# Patient Record
Sex: FEMALE | Race: WHITE | NOT HISPANIC OR LATINO | Employment: FULL TIME | URBAN - METROPOLITAN AREA
[De-identification: names, ages, dates, MRNs, and addresses within clinical notes are randomized per-mention and may not be internally consistent; named-entity substitution may affect disease eponyms.]

---

## 2023-05-03 PROBLEM — E66.9 OBESITY: Status: ACTIVE | Noted: 2023-05-03

## 2023-05-03 PROBLEM — E03.9 HYPOTHYROIDISM: Status: ACTIVE | Noted: 2023-05-03

## 2023-05-03 PROBLEM — R73.9 ELEVATED BLOOD SUGAR: Status: ACTIVE | Noted: 2023-05-03

## 2023-05-03 PROBLEM — R79.89 ELEVATED LIVER FUNCTION TESTS: Status: ACTIVE | Noted: 2023-05-03

## 2023-05-03 PROBLEM — G35 MULTIPLE SCLEROSIS (MULTI): Status: ACTIVE | Noted: 2023-05-03

## 2023-05-03 PROBLEM — E78.5 HYPERLIPEMIA: Status: ACTIVE | Noted: 2023-05-03

## 2023-05-03 PROBLEM — I10 BENIGN HYPERTENSION: Status: ACTIVE | Noted: 2023-05-03

## 2023-05-03 PROBLEM — R51.9 HEADACHE: Status: ACTIVE | Noted: 2023-05-03

## 2023-05-03 PROBLEM — D51.0 PERNICIOUS ANEMIA: Status: ACTIVE | Noted: 2023-05-03

## 2023-05-03 PROBLEM — L65.9 HAIR THINNING: Status: ACTIVE | Noted: 2023-05-03

## 2023-05-03 PROBLEM — K76.0 FATTY LIVER: Status: ACTIVE | Noted: 2023-05-03

## 2023-05-03 PROBLEM — D22.9 MULTIPLE ATYPICAL SKIN MOLES: Status: ACTIVE | Noted: 2023-05-03

## 2023-05-03 PROBLEM — B35.1 TOENAIL FUNGUS: Status: ACTIVE | Noted: 2023-05-03

## 2023-05-03 PROBLEM — M21.371 RIGHT FOOT DROP: Status: ACTIVE | Noted: 2023-05-03

## 2023-05-03 RX ORDER — AMLODIPINE BESYLATE 2.5 MG/1
1 TABLET ORAL DAILY
COMMUNITY
Start: 2020-04-09 | End: 2023-05-04 | Stop reason: SDUPTHER

## 2023-05-03 RX ORDER — CLADRIBINE 10 MG/1
TABLET ORAL
COMMUNITY
Start: 2022-04-06 | End: 2023-11-09 | Stop reason: ALTCHOICE

## 2023-05-03 RX ORDER — HYDROCHLOROTHIAZIDE 25 MG/1
1 TABLET ORAL DAILY
COMMUNITY
Start: 2020-05-04 | End: 2023-05-04 | Stop reason: SDUPTHER

## 2023-05-03 RX ORDER — CYANOCOBALAMIN 1000 UG/ML
INJECTION, SOLUTION INTRAMUSCULAR; SUBCUTANEOUS
COMMUNITY
Start: 2016-12-19

## 2023-05-03 RX ORDER — MULTIVITAMIN
1 TABLET ORAL DAILY
COMMUNITY
Start: 2021-05-04 | End: 2023-11-09 | Stop reason: ALTCHOICE

## 2023-05-03 RX ORDER — CICLOPIROX 80 MG/ML
SOLUTION TOPICAL
COMMUNITY
Start: 2022-05-19 | End: 2023-11-09 | Stop reason: ALTCHOICE

## 2023-05-03 RX ORDER — LOSARTAN POTASSIUM 100 MG/1
1 TABLET ORAL DAILY
COMMUNITY
Start: 2019-08-28 | End: 2023-05-04 | Stop reason: SDUPTHER

## 2023-05-03 RX ORDER — LEVOTHYROXINE SODIUM 75 UG/1
1 TABLET ORAL DAILY
COMMUNITY
Start: 2016-12-21 | End: 2023-11-12 | Stop reason: SDUPTHER

## 2023-05-03 RX ORDER — ROSUVASTATIN CALCIUM 10 MG/1
1 TABLET, COATED ORAL NIGHTLY
COMMUNITY
Start: 2020-05-26 | End: 2023-11-12 | Stop reason: SDUPTHER

## 2023-05-03 RX ORDER — CHOLECALCIFEROL (VITAMIN D3) 50 MCG
1 TABLET ORAL DAILY
COMMUNITY
Start: 2018-08-07

## 2023-05-04 ENCOUNTER — OFFICE VISIT (OUTPATIENT)
Dept: PRIMARY CARE | Facility: CLINIC | Age: 58
End: 2023-05-04
Payer: COMMERCIAL

## 2023-05-04 VITALS
TEMPERATURE: 97.1 F | SYSTOLIC BLOOD PRESSURE: 126 MMHG | WEIGHT: 256 LBS | DIASTOLIC BLOOD PRESSURE: 82 MMHG | BODY MASS INDEX: 43.94 KG/M2

## 2023-05-04 DIAGNOSIS — I10 BENIGN HYPERTENSION: Primary | ICD-10-CM

## 2023-05-04 DIAGNOSIS — E78.2 MIXED HYPERLIPIDEMIA: ICD-10-CM

## 2023-05-04 DIAGNOSIS — E03.9 ACQUIRED HYPOTHYROIDISM: ICD-10-CM

## 2023-05-04 DIAGNOSIS — R73.9 ELEVATED BLOOD SUGAR: ICD-10-CM

## 2023-05-04 DIAGNOSIS — G35 MULTIPLE SCLEROSIS (MULTI): ICD-10-CM

## 2023-05-04 DIAGNOSIS — E66.01 CLASS 3 SEVERE OBESITY DUE TO EXCESS CALORIES WITH SERIOUS COMORBIDITY AND BODY MASS INDEX (BMI) OF 40.0 TO 44.9 IN ADULT (MULTI): ICD-10-CM

## 2023-05-04 PROCEDURE — 1036F TOBACCO NON-USER: CPT | Performed by: NURSE PRACTITIONER

## 2023-05-04 PROCEDURE — 3079F DIAST BP 80-89 MM HG: CPT | Performed by: NURSE PRACTITIONER

## 2023-05-04 PROCEDURE — 3008F BODY MASS INDEX DOCD: CPT | Performed by: NURSE PRACTITIONER

## 2023-05-04 PROCEDURE — 99214 OFFICE O/P EST MOD 30 MIN: CPT | Performed by: NURSE PRACTITIONER

## 2023-05-04 PROCEDURE — 3074F SYST BP LT 130 MM HG: CPT | Performed by: NURSE PRACTITIONER

## 2023-05-04 RX ORDER — HYDROCHLOROTHIAZIDE 25 MG/1
25 TABLET ORAL DAILY
Qty: 90 TABLET | Refills: 1 | Status: SHIPPED | OUTPATIENT
Start: 2023-05-04 | End: 2023-11-20

## 2023-05-04 RX ORDER — LOSARTAN POTASSIUM 100 MG/1
100 TABLET ORAL DAILY
Qty: 90 TABLET | Refills: 1 | Status: SHIPPED | OUTPATIENT
Start: 2023-05-04 | End: 2023-11-12 | Stop reason: SDUPTHER

## 2023-05-04 RX ORDER — AMLODIPINE BESYLATE 2.5 MG/1
2.5 TABLET ORAL DAILY
Qty: 90 TABLET | Refills: 1 | Status: SHIPPED | OUTPATIENT
Start: 2023-05-04 | End: 2023-11-12 | Stop reason: SDUPTHER

## 2023-05-04 ASSESSMENT — ENCOUNTER SYMPTOMS
GASTROINTESTINAL NEGATIVE: 1
CARDIOVASCULAR NEGATIVE: 1
RESPIRATORY NEGATIVE: 1
CONSTITUTIONAL NEGATIVE: 1
PSYCHIATRIC NEGATIVE: 1

## 2023-05-04 NOTE — PROGRESS NOTES
Subjective   Patient ID: Anjana Rubin is a 58 y.o. female who presents for Follow-up (6 month follow).    HPI Presents today for follow up and medication renewal.  Tolerates medication well.  Has started taking all of her BP medications together in the morning and her BP at home has been 112-125/71-80  Is currently om Mavenclad for 2 years, is just finishing the last round.  Its purpose is to reset the immune system and should not need further treatments.   MS is stable sees neurology.  Mammogram as noted 3/23/2023.   Colon 2019, 5 year recheck  Has not had her shingrix vaccine yet but is waiting to finish this round of Mevanclad  Blood work 11/03/2022 reviewed as noted    Review of Systems   Constitutional: Negative.    Respiratory: Negative.     Cardiovascular: Negative.    Gastrointestinal: Negative.    Musculoskeletal:         As noted In HPI   Psychiatric/Behavioral: Negative.         Objective   /82 (BP Location: Left arm, Patient Position: Sitting)   Temp 36.2 °C (97.1 °F) (Temporal)   Wt 116 kg (256 lb)   BMI 43.94 kg/m²     Physical Exam  Constitutional:       Appearance: Normal appearance.   Cardiovascular:      Rate and Rhythm: Normal rate and regular rhythm.   Pulmonary:      Effort: Pulmonary effort is normal.      Breath sounds: Normal breath sounds.   Musculoskeletal:         General: Normal range of motion.   Neurological:      General: No focal deficit present.      Mental Status: She is alert.   Psychiatric:         Mood and Affect: Mood normal.         Behavior: Behavior normal.         Assessment/Plan   Problem List Items Addressed This Visit          Nervous    Multiple sclerosis (CMS/HCC)       Circulatory    Benign hypertension - Primary    Relevant Medications    losartan (Cozaar) 100 mg tablet    hydroCHLOROthiazide (HYDRODiuril) 25 mg tablet    amLODIPine (Norvasc) 2.5 mg tablet       Endocrine/Metabolic    Hypothyroidism    Obesity       Other    Elevated blood sugar    Hyperlipemia

## 2023-11-09 ENCOUNTER — OFFICE VISIT (OUTPATIENT)
Dept: PRIMARY CARE | Facility: CLINIC | Age: 58
End: 2023-11-09
Payer: COMMERCIAL

## 2023-11-09 ENCOUNTER — LAB (OUTPATIENT)
Dept: LAB | Facility: LAB | Age: 58
End: 2023-11-09
Payer: COMMERCIAL

## 2023-11-09 VITALS
BODY MASS INDEX: 44.73 KG/M2 | TEMPERATURE: 97.8 F | WEIGHT: 260.6 LBS | SYSTOLIC BLOOD PRESSURE: 134 MMHG | HEART RATE: 65 BPM | DIASTOLIC BLOOD PRESSURE: 82 MMHG

## 2023-11-09 DIAGNOSIS — E03.9 ACQUIRED HYPOTHYROIDISM: ICD-10-CM

## 2023-11-09 DIAGNOSIS — R73.9 ELEVATED BLOOD SUGAR: ICD-10-CM

## 2023-11-09 DIAGNOSIS — E78.2 MIXED HYPERLIPIDEMIA: ICD-10-CM

## 2023-11-09 DIAGNOSIS — I10 BENIGN HYPERTENSION: Primary | ICD-10-CM

## 2023-11-09 DIAGNOSIS — G35 MULTIPLE SCLEROSIS (MULTI): ICD-10-CM

## 2023-11-09 DIAGNOSIS — Z23 NEED FOR VACCINATION: ICD-10-CM

## 2023-11-09 DIAGNOSIS — I10 BENIGN HYPERTENSION: ICD-10-CM

## 2023-11-09 PROBLEM — L71.9 ROSACEA, UNSPECIFIED: Status: ACTIVE | Noted: 2021-11-03

## 2023-11-09 PROBLEM — L82.1 OTHER SEBORRHEIC KERATOSIS: Status: ACTIVE | Noted: 2021-11-03

## 2023-11-09 PROBLEM — M21.371 RIGHT FOOT DROP: Status: RESOLVED | Noted: 2023-05-03 | Resolved: 2023-11-09

## 2023-11-09 PROBLEM — R51.9 HEADACHE: Status: RESOLVED | Noted: 2023-05-03 | Resolved: 2023-11-09

## 2023-11-09 PROBLEM — B35.1 TOENAIL FUNGUS: Status: RESOLVED | Noted: 2023-05-03 | Resolved: 2023-11-09

## 2023-11-09 PROBLEM — D22.5 MELANOCYTIC NEVI OF TRUNK: Status: ACTIVE | Noted: 2021-11-03

## 2023-11-09 PROBLEM — D18.01 HEMANGIOMA OF SKIN AND SUBCUTANEOUS TISSUE: Status: ACTIVE | Noted: 2021-11-03

## 2023-11-09 PROBLEM — D17.21 BENIGN LIPOMATOUS NEOPLASM OF SKIN AND SUBCUTANEOUS TISSUE OF RIGHT ARM: Status: ACTIVE | Noted: 2021-11-03

## 2023-11-09 LAB
ALBUMIN SERPL BCP-MCNC: 4.6 G/DL (ref 3.4–5)
ALP SERPL-CCNC: 88 U/L (ref 33–110)
ALT SERPL W P-5'-P-CCNC: 93 U/L (ref 7–45)
ANION GAP SERPL CALC-SCNC: 16 MMOL/L (ref 10–20)
AST SERPL W P-5'-P-CCNC: 54 U/L (ref 9–39)
BASOPHILS # BLD AUTO: 0.04 X10*3/UL (ref 0–0.1)
BASOPHILS NFR BLD AUTO: 0.9 %
BILIRUB SERPL-MCNC: 0.8 MG/DL (ref 0–1.2)
BUN SERPL-MCNC: 14 MG/DL (ref 6–23)
CALCIUM SERPL-MCNC: 10.7 MG/DL (ref 8.6–10.3)
CHLORIDE SERPL-SCNC: 103 MMOL/L (ref 98–107)
CHOLEST SERPL-MCNC: 197 MG/DL (ref 0–199)
CHOLESTEROL/HDL RATIO: 4.5
CO2 SERPL-SCNC: 26 MMOL/L (ref 21–32)
CREAT SERPL-MCNC: 0.7 MG/DL (ref 0.5–1.05)
EOSINOPHIL # BLD AUTO: 0.14 X10*3/UL (ref 0–0.7)
EOSINOPHIL NFR BLD AUTO: 3.2 %
ERYTHROCYTE [DISTWIDTH] IN BLOOD BY AUTOMATED COUNT: 11.9 % (ref 11.5–14.5)
GFR SERPL CREATININE-BSD FRML MDRD: >90 ML/MIN/1.73M*2
GLUCOSE SERPL-MCNC: 102 MG/DL (ref 74–99)
HCT VFR BLD AUTO: 43.7 % (ref 36–46)
HDLC SERPL-MCNC: 43.9 MG/DL
HGB BLD-MCNC: 14.9 G/DL (ref 12–16)
IMM GRANULOCYTES # BLD AUTO: 0.02 X10*3/UL (ref 0–0.7)
IMM GRANULOCYTES NFR BLD AUTO: 0.5 % (ref 0–0.9)
LDLC SERPL CALC-MCNC: 109 MG/DL
LYMPHOCYTES # BLD AUTO: 0.59 X10*3/UL (ref 1.2–4.8)
LYMPHOCYTES NFR BLD AUTO: 13.5 %
MCH RBC QN AUTO: 33.7 PG (ref 26–34)
MCHC RBC AUTO-ENTMCNC: 34.1 G/DL (ref 32–36)
MCV RBC AUTO: 99 FL (ref 80–100)
MONOCYTES # BLD AUTO: 0.44 X10*3/UL (ref 0.1–1)
MONOCYTES NFR BLD AUTO: 10.1 %
NEUTROPHILS # BLD AUTO: 3.13 X10*3/UL (ref 1.2–7.7)
NEUTROPHILS NFR BLD AUTO: 71.8 %
NON HDL CHOLESTEROL: 153 MG/DL (ref 0–149)
NRBC BLD-RTO: 0 /100 WBCS (ref 0–0)
PLATELET # BLD AUTO: 185 X10*3/UL (ref 150–450)
POTASSIUM SERPL-SCNC: 4.1 MMOL/L (ref 3.5–5.3)
PROT SERPL-MCNC: 6.6 G/DL (ref 6.4–8.2)
RBC # BLD AUTO: 4.42 X10*6/UL (ref 4–5.2)
SODIUM SERPL-SCNC: 141 MMOL/L (ref 136–145)
TRIGL SERPL-MCNC: 220 MG/DL (ref 0–149)
TSH SERPL-ACNC: 1.54 MIU/L (ref 0.44–3.98)
VLDL: 44 MG/DL (ref 0–40)
WBC # BLD AUTO: 4.4 X10*3/UL (ref 4.4–11.3)

## 2023-11-09 PROCEDURE — 90686 IIV4 VACC NO PRSV 0.5 ML IM: CPT | Performed by: NURSE PRACTITIONER

## 2023-11-09 PROCEDURE — 84443 ASSAY THYROID STIM HORMONE: CPT

## 2023-11-09 PROCEDURE — 83036 HEMOGLOBIN GLYCOSYLATED A1C: CPT

## 2023-11-09 PROCEDURE — 80061 LIPID PANEL: CPT

## 2023-11-09 PROCEDURE — 36415 COLL VENOUS BLD VENIPUNCTURE: CPT

## 2023-11-09 PROCEDURE — 90471 IMMUNIZATION ADMIN: CPT | Performed by: NURSE PRACTITIONER

## 2023-11-09 PROCEDURE — 85025 COMPLETE CBC W/AUTO DIFF WBC: CPT

## 2023-11-09 PROCEDURE — 99214 OFFICE O/P EST MOD 30 MIN: CPT | Performed by: NURSE PRACTITIONER

## 2023-11-09 PROCEDURE — 3008F BODY MASS INDEX DOCD: CPT | Performed by: NURSE PRACTITIONER

## 2023-11-09 PROCEDURE — 3075F SYST BP GE 130 - 139MM HG: CPT | Performed by: NURSE PRACTITIONER

## 2023-11-09 PROCEDURE — 1036F TOBACCO NON-USER: CPT | Performed by: NURSE PRACTITIONER

## 2023-11-09 PROCEDURE — 80053 COMPREHEN METABOLIC PANEL: CPT

## 2023-11-09 PROCEDURE — 3079F DIAST BP 80-89 MM HG: CPT | Performed by: NURSE PRACTITIONER

## 2023-11-09 ASSESSMENT — ENCOUNTER SYMPTOMS
NEUROLOGICAL NEGATIVE: 1
MUSCULOSKELETAL NEGATIVE: 1
PSYCHIATRIC NEGATIVE: 1
RESPIRATORY NEGATIVE: 1
CARDIOVASCULAR NEGATIVE: 1
CONSTITUTIONAL NEGATIVE: 1

## 2023-11-09 NOTE — PROGRESS NOTES
Subjective   Patient ID: Anjana Rubin is a 58 y.o. female who presents for Follow-up (6 month), Med Refill, and Flu Vaccine (Would like/screening questions asked).    HPI Presents today for follow up and medication renewal.  Tolerates medication well.  MS is stable  Feeling well in general  Due for blood work mammogram up to date, Colonoscopy due next year  Review of Systems   Constitutional: Negative.    Respiratory: Negative.     Cardiovascular: Negative.    Musculoskeletal: Negative.    Neurological: Negative.    Psychiatric/Behavioral: Negative.         Objective   /82   Pulse 65   Temp 36.6 °C (97.8 °F)   Wt 118 kg (260 lb 9.6 oz)   BMI 44.73 kg/m²     Physical Exam  Constitutional:       Appearance: Normal appearance. She is obese.   Cardiovascular:      Rate and Rhythm: Normal rate and regular rhythm.   Pulmonary:      Effort: Pulmonary effort is normal.      Breath sounds: Normal breath sounds.   Abdominal:      General: Abdomen is flat. Bowel sounds are normal.      Palpations: Abdomen is soft.   Musculoskeletal:         General: Normal range of motion.   Neurological:      General: No focal deficit present.      Mental Status: She is alert.   Psychiatric:         Mood and Affect: Mood normal.         Behavior: Behavior normal.         Assessment/Plan   Problem List Items Addressed This Visit             ICD-10-CM    Benign hypertension I10    Relevant Orders    Comprehensive Metabolic Panel    Lipid Panel    Thyroid Stimulating Hormone    CBC and Auto Differential    Elevated blood sugar R73.9    Relevant Orders    Hemoglobin A1C    Hyperlipemia E78.5    Relevant Orders    Comprehensive Metabolic Panel    Lipid Panel    CBC and Auto Differential    Hypothyroidism E03.9    Relevant Orders    Thyroid Stimulating Hormone    Multiple sclerosis (CMS/HCC) G35     Other Visit Diagnoses         Codes    Need for vaccination    -  Primary Z23    Relevant Orders    Flu vaccine (IIV4) age 6 months and  greater, preservative free (Completed)

## 2023-11-09 NOTE — PATIENT INSTRUCTIONS
I will follow up with the blood work and refill medication.   Follow up in 6 months and as needed.   Influenza vaccine given today.

## 2023-11-10 LAB
EST. AVERAGE GLUCOSE BLD GHB EST-MCNC: 137 MG/DL
HBA1C MFR BLD: 6.4 %

## 2023-11-12 DIAGNOSIS — E03.9 ACQUIRED HYPOTHYROIDISM: ICD-10-CM

## 2023-11-12 DIAGNOSIS — E78.2 MIXED HYPERLIPIDEMIA: Primary | ICD-10-CM

## 2023-11-12 DIAGNOSIS — I10 BENIGN HYPERTENSION: ICD-10-CM

## 2023-11-12 RX ORDER — AMLODIPINE BESYLATE 2.5 MG/1
2.5 TABLET ORAL DAILY
Qty: 90 TABLET | Refills: 1 | Status: SHIPPED | OUTPATIENT
Start: 2023-11-12 | End: 2024-04-13

## 2023-11-12 RX ORDER — LEVOTHYROXINE SODIUM 75 UG/1
75 TABLET ORAL DAILY
Qty: 90 TABLET | Refills: 3 | Status: SHIPPED | OUTPATIENT
Start: 2023-11-12

## 2023-11-12 RX ORDER — ROSUVASTATIN CALCIUM 10 MG/1
10 TABLET, COATED ORAL NIGHTLY
Qty: 90 TABLET | Refills: 3 | Status: SHIPPED | OUTPATIENT
Start: 2023-11-12

## 2023-11-12 RX ORDER — LOSARTAN POTASSIUM 100 MG/1
100 TABLET ORAL DAILY
Qty: 90 TABLET | Refills: 1 | Status: SHIPPED | OUTPATIENT
Start: 2023-11-12 | End: 2024-05-09 | Stop reason: SDUPTHER

## 2023-11-19 DIAGNOSIS — I10 BENIGN HYPERTENSION: ICD-10-CM

## 2023-11-20 PROBLEM — L57.9 SKIN CHANGES DUE TO CHRONIC EXPOSURE TO NONIONIZING RADIATION, UNSPECIFIED: Status: ACTIVE | Noted: 2021-11-03

## 2023-11-20 PROBLEM — L81.4 OTHER MELANIN HYPERPIGMENTATION: Status: RESOLVED | Noted: 2021-11-03 | Resolved: 2023-11-20

## 2023-11-20 RX ORDER — HYDROCHLOROTHIAZIDE 25 MG/1
25 TABLET ORAL DAILY
Qty: 90 TABLET | Refills: 1 | Status: SHIPPED | OUTPATIENT
Start: 2023-11-20 | End: 2024-05-09 | Stop reason: SDUPTHER

## 2024-04-11 DIAGNOSIS — I10 BENIGN HYPERTENSION: ICD-10-CM

## 2024-04-13 RX ORDER — AMLODIPINE BESYLATE 2.5 MG/1
2.5 TABLET ORAL DAILY
Qty: 90 TABLET | Refills: 0 | Status: SHIPPED | OUTPATIENT
Start: 2024-04-13 | End: 2024-05-09 | Stop reason: SDUPTHER

## 2024-05-09 ENCOUNTER — OFFICE VISIT (OUTPATIENT)
Dept: PRIMARY CARE | Facility: CLINIC | Age: 59
End: 2024-05-09
Payer: COMMERCIAL

## 2024-05-09 VITALS
HEART RATE: 76 BPM | OXYGEN SATURATION: 98 % | BODY MASS INDEX: 43.87 KG/M2 | HEIGHT: 64 IN | DIASTOLIC BLOOD PRESSURE: 86 MMHG | WEIGHT: 257 LBS | TEMPERATURE: 97.9 F | SYSTOLIC BLOOD PRESSURE: 138 MMHG

## 2024-05-09 DIAGNOSIS — I10 BENIGN HYPERTENSION: Primary | ICD-10-CM

## 2024-05-09 DIAGNOSIS — E78.2 MIXED HYPERLIPIDEMIA: ICD-10-CM

## 2024-05-09 DIAGNOSIS — G35 MULTIPLE SCLEROSIS (MULTI): ICD-10-CM

## 2024-05-09 DIAGNOSIS — G95.19 OTHER VASCULAR MYELOPATHIES (MULTI): ICD-10-CM

## 2024-05-09 DIAGNOSIS — R73.9 ELEVATED BLOOD SUGAR: ICD-10-CM

## 2024-05-09 LAB — POC HEMOGLOBIN A1C: 6 % (ref 4.2–6.5)

## 2024-05-09 PROCEDURE — 3079F DIAST BP 80-89 MM HG: CPT | Performed by: NURSE PRACTITIONER

## 2024-05-09 PROCEDURE — 1036F TOBACCO NON-USER: CPT | Performed by: NURSE PRACTITIONER

## 2024-05-09 PROCEDURE — 83036 HEMOGLOBIN GLYCOSYLATED A1C: CPT | Performed by: NURSE PRACTITIONER

## 2024-05-09 PROCEDURE — 3075F SYST BP GE 130 - 139MM HG: CPT | Performed by: NURSE PRACTITIONER

## 2024-05-09 PROCEDURE — 99214 OFFICE O/P EST MOD 30 MIN: CPT | Performed by: NURSE PRACTITIONER

## 2024-05-09 RX ORDER — LOSARTAN POTASSIUM 100 MG/1
100 TABLET ORAL DAILY
Qty: 90 TABLET | Refills: 1 | Status: SHIPPED | OUTPATIENT
Start: 2024-05-09

## 2024-05-09 RX ORDER — AMLODIPINE BESYLATE 2.5 MG/1
2.5 TABLET ORAL DAILY
Qty: 90 TABLET | Refills: 0 | Status: SHIPPED | OUTPATIENT
Start: 2024-05-09

## 2024-05-09 RX ORDER — HYDROCHLOROTHIAZIDE 25 MG/1
25 TABLET ORAL DAILY
Qty: 90 TABLET | Refills: 1 | Status: SHIPPED | OUTPATIENT
Start: 2024-05-09

## 2024-05-09 ASSESSMENT — ENCOUNTER SYMPTOMS
RESPIRATORY NEGATIVE: 1
PSYCHIATRIC NEGATIVE: 1
MUSCULOSKELETAL NEGATIVE: 1
CARDIOVASCULAR NEGATIVE: 1
CONSTITUTIONAL NEGATIVE: 1
NEUROLOGICAL NEGATIVE: 1

## 2024-05-09 ASSESSMENT — PATIENT HEALTH QUESTIONNAIRE - PHQ9
1. LITTLE INTEREST OR PLEASURE IN DOING THINGS: NOT AT ALL
2. FEELING DOWN, DEPRESSED OR HOPELESS: NOT AT ALL
SUM OF ALL RESPONSES TO PHQ9 QUESTIONS 1 AND 2: 0

## 2024-05-09 NOTE — PATIENT INSTRUCTIONS
Medications renewed.   Hba1c 6.0 today which is improved since last check in November.   Work on reducing simple carbohydrates in your diet and add some exercise.   Follow up in 6 months and as needed.      Quality 431: Preventive Care And Screening: Unhealthy Alcohol Use - Screening: Patient screened for unhealthy alcohol use using a single question and scores less than 2 times per year Quality 226: Preventive Care And Screening: Tobacco Use: Screening And Cessation Intervention: Patient screened for tobacco and never smoked Quality 130: Documentation Of Current Medications In The Medical Record: Current Medications Documented Detail Level: Detailed Quality 128: Preventive Care And Screening: Body Mass Index (Bmi) Screening And Follow-Up Plan: BMI is documented above normal parameters and a follow-up plan is documented

## 2024-05-09 NOTE — PROGRESS NOTES
"Subjective   Patient ID: Anjana Rubin is a 59 y.o. female who presents for Follow-up (6 month).    HPI Presents today for follow up and medication renewal.  Tolerates medication well.  Mammogram scheduled June 6  Blood sugar this morning 128 this morning  Is trying to follow a diabetic diet.   Minimal sweets.   Knows it is that she eats more breads rice and pasta  Has not been walking on treadmill and will get back to her.   MS is stable sees neurology as noted.   Blood work 11/2023 reviewed as noted    Review of Systems   Constitutional: Negative.    Respiratory: Negative.     Cardiovascular: Negative.    Musculoskeletal: Negative.    Neurological: Negative.    Psychiatric/Behavioral: Negative.         Objective   /86 (BP Location: Right arm, Patient Position: Sitting)   Pulse 76   Temp 36.6 °C (97.9 °F) (Temporal)   Ht 1.626 m (5' 4\")   Wt 117 kg (257 lb)   SpO2 98%   BMI 44.11 kg/m²     Physical Exam  Constitutional:       Appearance: Normal appearance.   Cardiovascular:      Rate and Rhythm: Normal rate and regular rhythm.   Pulmonary:      Effort: Pulmonary effort is normal.      Breath sounds: Normal breath sounds.   Musculoskeletal:         General: Normal range of motion.   Neurological:      General: No focal deficit present.      Mental Status: She is alert.   Psychiatric:         Mood and Affect: Mood normal.         Behavior: Behavior normal.         Assessment/Plan   Problem List Items Addressed This Visit             ICD-10-CM    Benign hypertension I10    Relevant Medications    losartan (Cozaar) 100 mg tablet    hydroCHLOROthiazide (HYDRODiuril) 25 mg tablet    amLODIPine (Norvasc) 2.5 mg tablet    Elevated blood sugar R73.9    Hyperlipemia E78.5    Multiple sclerosis (Multi) G35    Other vascular myelopathies (Multi) - Primary G95.19          "

## 2024-10-09 DIAGNOSIS — I10 BENIGN HYPERTENSION: ICD-10-CM

## 2024-10-10 RX ORDER — AMLODIPINE BESYLATE 2.5 MG/1
2.5 TABLET ORAL DAILY
Qty: 90 TABLET | Refills: 0 | Status: SHIPPED | OUTPATIENT
Start: 2024-10-10

## 2024-11-06 DIAGNOSIS — E03.9 ACQUIRED HYPOTHYROIDISM: ICD-10-CM

## 2024-11-07 RX ORDER — LEVOTHYROXINE SODIUM 75 UG/1
TABLET ORAL
Qty: 90 TABLET | Refills: 0 | Status: SHIPPED | OUTPATIENT
Start: 2024-11-07

## 2024-11-12 DIAGNOSIS — I10 BENIGN HYPERTENSION: ICD-10-CM

## 2024-11-13 RX ORDER — HYDROCHLOROTHIAZIDE 25 MG/1
25 TABLET ORAL DAILY
Qty: 90 TABLET | Refills: 1 | Status: SHIPPED | OUTPATIENT
Start: 2024-11-13

## 2024-11-14 ENCOUNTER — APPOINTMENT (OUTPATIENT)
Dept: PRIMARY CARE | Facility: CLINIC | Age: 59
End: 2024-11-14
Payer: COMMERCIAL

## 2024-11-21 ENCOUNTER — LAB (OUTPATIENT)
Dept: LAB | Facility: LAB | Age: 59
End: 2024-11-21
Payer: MEDICARE

## 2024-11-21 ENCOUNTER — APPOINTMENT (OUTPATIENT)
Dept: PRIMARY CARE | Facility: CLINIC | Age: 59
End: 2024-11-21
Payer: COMMERCIAL

## 2024-11-21 VITALS
DIASTOLIC BLOOD PRESSURE: 82 MMHG | SYSTOLIC BLOOD PRESSURE: 136 MMHG | BODY MASS INDEX: 44.29 KG/M2 | TEMPERATURE: 97.8 F | OXYGEN SATURATION: 98 % | WEIGHT: 258 LBS | HEART RATE: 80 BPM

## 2024-11-21 DIAGNOSIS — E78.2 MIXED HYPERLIPIDEMIA: Primary | ICD-10-CM

## 2024-11-21 DIAGNOSIS — E78.2 MIXED HYPERLIPIDEMIA: ICD-10-CM

## 2024-11-21 DIAGNOSIS — G35 MULTIPLE SCLEROSIS (MULTI): ICD-10-CM

## 2024-11-21 DIAGNOSIS — E53.8 DEFICIENCY OF OTHER SPECIFIED B GROUP VITAMINS: ICD-10-CM

## 2024-11-21 DIAGNOSIS — E03.9 ACQUIRED HYPOTHYROIDISM: ICD-10-CM

## 2024-11-21 DIAGNOSIS — R73.9 ELEVATED BLOOD SUGAR: ICD-10-CM

## 2024-11-21 DIAGNOSIS — I10 BENIGN HYPERTENSION: ICD-10-CM

## 2024-11-21 DIAGNOSIS — G35 MULTIPLE SCLEROSIS (MULTI): Primary | ICD-10-CM

## 2024-11-21 LAB
ALBUMIN SERPL BCP-MCNC: 4.5 G/DL (ref 3.4–5)
ALP SERPL-CCNC: 81 U/L (ref 33–110)
ALT SERPL W P-5'-P-CCNC: 88 U/L (ref 7–45)
ANION GAP SERPL CALC-SCNC: 12 MMOL/L (ref 10–20)
AST SERPL W P-5'-P-CCNC: 46 U/L (ref 9–39)
BASOPHILS # BLD AUTO: 0.02 X10*3/UL (ref 0–0.1)
BASOPHILS NFR BLD AUTO: 0.4 %
BILIRUB SERPL-MCNC: 0.6 MG/DL (ref 0–1.2)
BUN SERPL-MCNC: 13 MG/DL (ref 6–23)
CALCIUM SERPL-MCNC: 10.2 MG/DL (ref 8.6–10.3)
CHLORIDE SERPL-SCNC: 104 MMOL/L (ref 98–107)
CHOLEST SERPL-MCNC: 207 MG/DL (ref 0–199)
CHOLESTEROL/HDL RATIO: 4.5
CO2 SERPL-SCNC: 27 MMOL/L (ref 21–32)
CREAT SERPL-MCNC: 0.72 MG/DL (ref 0.5–1.05)
EGFRCR SERPLBLD CKD-EPI 2021: >90 ML/MIN/1.73M*2
EOSINOPHIL # BLD AUTO: 0.22 X10*3/UL (ref 0–0.7)
EOSINOPHIL NFR BLD AUTO: 4.8 %
ERYTHROCYTE [DISTWIDTH] IN BLOOD BY AUTOMATED COUNT: 11.9 % (ref 11.5–14.5)
GLUCOSE SERPL-MCNC: 124 MG/DL (ref 74–99)
HCT VFR BLD AUTO: 43.8 % (ref 36–46)
HDLC SERPL-MCNC: 46.3 MG/DL
HGB BLD-MCNC: 14.7 G/DL (ref 12–16)
IMM GRANULOCYTES # BLD AUTO: 0.01 X10*3/UL (ref 0–0.7)
IMM GRANULOCYTES NFR BLD AUTO: 0.2 % (ref 0–0.9)
LDLC SERPL CALC-MCNC: 110 MG/DL
LYMPHOCYTES # BLD AUTO: 0.78 X10*3/UL (ref 1.2–4.8)
LYMPHOCYTES NFR BLD AUTO: 17.1 %
MCH RBC QN AUTO: 33.9 PG (ref 26–34)
MCHC RBC AUTO-ENTMCNC: 33.6 G/DL (ref 32–36)
MCV RBC AUTO: 101 FL (ref 80–100)
MONOCYTES # BLD AUTO: 0.49 X10*3/UL (ref 0.1–1)
MONOCYTES NFR BLD AUTO: 10.7 %
NEUTROPHILS # BLD AUTO: 3.04 X10*3/UL (ref 1.2–7.7)
NEUTROPHILS NFR BLD AUTO: 66.8 %
NON HDL CHOLESTEROL: 161 MG/DL (ref 0–149)
NRBC BLD-RTO: 0 /100 WBCS (ref 0–0)
PLATELET # BLD AUTO: 172 X10*3/UL (ref 150–450)
POTASSIUM SERPL-SCNC: 3.8 MMOL/L (ref 3.5–5.3)
PROT SERPL-MCNC: 6.8 G/DL (ref 6.4–8.2)
RBC # BLD AUTO: 4.34 X10*6/UL (ref 4–5.2)
SODIUM SERPL-SCNC: 139 MMOL/L (ref 136–145)
TRIGL SERPL-MCNC: 255 MG/DL (ref 0–149)
TSH SERPL-ACNC: 1.93 MIU/L (ref 0.44–3.98)
VIT B12 SERPL-MCNC: 423 PG/ML (ref 211–911)
VLDL: 51 MG/DL (ref 0–40)
WBC # BLD AUTO: 4.6 X10*3/UL (ref 4.4–11.3)

## 2024-11-21 PROCEDURE — 99214 OFFICE O/P EST MOD 30 MIN: CPT | Performed by: NURSE PRACTITIONER

## 2024-11-21 PROCEDURE — G2211 COMPLEX E/M VISIT ADD ON: HCPCS | Performed by: NURSE PRACTITIONER

## 2024-11-21 PROCEDURE — 1036F TOBACCO NON-USER: CPT | Performed by: NURSE PRACTITIONER

## 2024-11-21 PROCEDURE — 84443 ASSAY THYROID STIM HORMONE: CPT

## 2024-11-21 PROCEDURE — 85025 COMPLETE CBC W/AUTO DIFF WBC: CPT

## 2024-11-21 PROCEDURE — 36415 COLL VENOUS BLD VENIPUNCTURE: CPT

## 2024-11-21 PROCEDURE — 82607 VITAMIN B-12: CPT

## 2024-11-21 PROCEDURE — 80053 COMPREHEN METABOLIC PANEL: CPT

## 2024-11-21 PROCEDURE — 80061 LIPID PANEL: CPT

## 2024-11-21 PROCEDURE — 3079F DIAST BP 80-89 MM HG: CPT | Performed by: NURSE PRACTITIONER

## 2024-11-21 PROCEDURE — 83036 HEMOGLOBIN GLYCOSYLATED A1C: CPT

## 2024-11-21 PROCEDURE — 3075F SYST BP GE 130 - 139MM HG: CPT | Performed by: NURSE PRACTITIONER

## 2024-11-21 ASSESSMENT — ENCOUNTER SYMPTOMS
PSYCHIATRIC NEGATIVE: 1
RESPIRATORY NEGATIVE: 1
CONSTITUTIONAL NEGATIVE: 1
MUSCULOSKELETAL NEGATIVE: 1
CARDIOVASCULAR NEGATIVE: 1
NEUROLOGICAL NEGATIVE: 1

## 2024-11-21 NOTE — PATIENT INSTRUCTIONS
I will follow up with the blood work and refill medications.   Follow up in 6 months and as needed.

## 2024-11-21 NOTE — PROGRESS NOTES
Subjective   Patient ID: Anjana Rubin is a 59 y.o. female who presents for Follow-up (6 month blood pressure).    HPI Presents today for follow up and medication renewal.  Tolerates medication well.  Due for colonoscopy, will call and set up  Mammogram 6/6/2024 normal.   /70s at other appointments.   Blood work 11/2023 reviewed as noted.   Her disability just went through.   MS is stable, see neurology routinely, they have CBC and CMP ordered.   Immunizations are up to date  Review of Systems   Constitutional: Negative.    Respiratory: Negative.     Cardiovascular: Negative.    Musculoskeletal: Negative.    Neurological: Negative.    Psychiatric/Behavioral: Negative.         Objective   /82 (BP Location: Left arm, Patient Position: Sitting)   Pulse 80   Temp 36.6 °C (97.8 °F) (Temporal)   Wt 117 kg (258 lb)   SpO2 98%   BMI 44.29 kg/m²     Physical Exam  Constitutional:       Appearance: Normal appearance. She is obese.   Cardiovascular:      Rate and Rhythm: Normal rate and regular rhythm.   Pulmonary:      Effort: Pulmonary effort is normal.      Breath sounds: Normal breath sounds.   Musculoskeletal:         General: Normal range of motion.   Neurological:      General: No focal deficit present.      Mental Status: She is alert.   Psychiatric:         Mood and Affect: Mood normal.         Behavior: Behavior normal.         Assessment/Plan   Problem List Items Addressed This Visit             ICD-10-CM    Benign hypertension I10    Elevated blood sugar R73.9    Relevant Orders    Hemoglobin A1C    Hyperlipemia - Primary E78.5    Relevant Orders    Lipid Panel    Hypothyroidism E03.9    Relevant Orders    TSH with reflex to Free T4 if abnormal    Multiple sclerosis (Multi) G35

## 2024-11-23 LAB
EST. AVERAGE GLUCOSE BLD GHB EST-MCNC: 123 MG/DL
HBA1C MFR BLD: 5.9 %

## 2024-11-25 DIAGNOSIS — E03.9 ACQUIRED HYPOTHYROIDISM: ICD-10-CM

## 2024-11-25 DIAGNOSIS — I10 BENIGN HYPERTENSION: ICD-10-CM

## 2024-11-25 DIAGNOSIS — E78.2 MIXED HYPERLIPIDEMIA: ICD-10-CM

## 2024-11-25 RX ORDER — LEVOTHYROXINE SODIUM 75 UG/1
75 TABLET ORAL DAILY
Qty: 90 TABLET | Refills: 3 | Status: SHIPPED | OUTPATIENT
Start: 2024-11-25

## 2024-11-25 RX ORDER — HYDROCHLOROTHIAZIDE 25 MG/1
25 TABLET ORAL DAILY
Qty: 90 TABLET | Refills: 1 | Status: SHIPPED | OUTPATIENT
Start: 2024-11-25

## 2024-11-25 RX ORDER — AMLODIPINE BESYLATE 2.5 MG/1
2.5 TABLET ORAL DAILY
Qty: 90 TABLET | Refills: 0 | Status: SHIPPED | OUTPATIENT
Start: 2024-11-25

## 2024-11-25 RX ORDER — ROSUVASTATIN CALCIUM 20 MG/1
20 TABLET, COATED ORAL NIGHTLY
Qty: 90 TABLET | Refills: 3 | Status: SHIPPED | OUTPATIENT
Start: 2024-11-25

## 2024-11-25 RX ORDER — ROSUVASTATIN CALCIUM 10 MG/1
20 TABLET, COATED ORAL NIGHTLY
Qty: 90 TABLET | Refills: 3 | Status: SHIPPED
Start: 2024-11-25 | End: 2024-11-25 | Stop reason: SDUPTHER

## 2024-11-25 RX ORDER — LOSARTAN POTASSIUM 100 MG/1
100 TABLET ORAL DAILY
Qty: 90 TABLET | Refills: 1 | Status: SHIPPED | OUTPATIENT
Start: 2024-11-25

## 2024-12-12 DIAGNOSIS — Z12.11 COLON CANCER SCREENING: ICD-10-CM

## 2024-12-12 RX ORDER — POLYETHYLENE GLYCOL 3350, SODIUM SULFATE ANHYDROUS, SODIUM BICARBONATE, SODIUM CHLORIDE, POTASSIUM CHLORIDE 236; 22.74; 6.74; 5.86; 2.97 G/4L; G/4L; G/4L; G/4L; G/4L
POWDER, FOR SOLUTION ORAL
Qty: 4000 ML | Refills: 0 | Status: SHIPPED | OUTPATIENT
Start: 2024-12-12

## 2025-01-29 ENCOUNTER — LAB REQUISITION (OUTPATIENT)
Dept: LAB | Facility: HOSPITAL | Age: 60
End: 2025-01-29
Payer: MEDICARE

## 2025-01-29 ENCOUNTER — APPOINTMENT (OUTPATIENT)
Dept: GASTROENTEROLOGY | Facility: EXTERNAL LOCATION | Age: 60
End: 2025-01-29
Payer: MEDICARE

## 2025-01-29 DIAGNOSIS — Z12.11 COLON CANCER SCREENING: Primary | ICD-10-CM

## 2025-01-29 DIAGNOSIS — Z86.0100 PERSONAL HISTORY OF COLON POLYPS, UNSPECIFIED: ICD-10-CM

## 2025-01-29 DIAGNOSIS — Z86.0101 HISTORY OF ADENOMATOUS POLYP OF COLON: ICD-10-CM

## 2025-01-29 DIAGNOSIS — D12.2 BENIGN NEOPLASM OF ASCENDING COLON: ICD-10-CM

## 2025-01-29 PROCEDURE — 88305 TISSUE EXAM BY PATHOLOGIST: CPT

## 2025-01-29 PROCEDURE — 0753T DGTZ GLS MCRSCP SLD LEVEL IV: CPT

## 2025-01-29 PROCEDURE — 45385 COLONOSCOPY W/LESION REMOVAL: CPT | Performed by: INTERNAL MEDICINE

## 2025-01-29 PROCEDURE — 0753T DGTZ GLS MCRSCP SLD LEVEL IV: CPT | Mod: TC,SUR,OUT | Performed by: INTERNAL MEDICINE

## 2025-01-29 PROCEDURE — 88305 TISSUE EXAM BY PATHOLOGIST: CPT | Performed by: PATHOLOGY

## 2025-02-05 LAB
LABORATORY COMMENT REPORT: NORMAL
PATH REPORT.FINAL DX SPEC: NORMAL
PATH REPORT.GROSS SPEC: NORMAL
PATH REPORT.MICROSCOPIC SPEC OTHER STN: NORMAL
PATH REPORT.RELEVANT HX SPEC: NORMAL
PATH REPORT.TOTAL CANCER: NORMAL

## 2025-02-05 NOTE — RESULT ENCOUNTER NOTE
The polyp that was removed from your colon was an adenoma (benign but precancerous).  The recommendation is to repeat colonoscopy in 5 years.      Amos Roldan MD

## 2025-04-02 DIAGNOSIS — I10 BENIGN HYPERTENSION: ICD-10-CM

## 2025-04-02 PROBLEM — J34.89 NASAL DISCHARGE: Status: RESOLVED | Noted: 2025-04-02 | Resolved: 2025-04-02

## 2025-04-02 PROBLEM — H26.9 CATARACT OF BOTH EYES: Status: ACTIVE | Noted: 2025-04-02

## 2025-04-02 PROBLEM — M25.569 KNEE PAIN: Status: RESOLVED | Noted: 2025-04-02 | Resolved: 2025-04-02

## 2025-04-02 RX ORDER — AMLODIPINE BESYLATE 2.5 MG/1
2.5 TABLET ORAL DAILY
Qty: 90 TABLET | Refills: 0 | Status: SHIPPED | OUTPATIENT
Start: 2025-04-02

## 2025-05-22 ENCOUNTER — APPOINTMENT (OUTPATIENT)
Dept: PRIMARY CARE | Facility: CLINIC | Age: 60
End: 2025-05-22
Payer: MEDICARE

## 2025-05-22 VITALS
HEIGHT: 64 IN | WEIGHT: 257 LBS | BODY MASS INDEX: 43.87 KG/M2 | TEMPERATURE: 97.5 F | DIASTOLIC BLOOD PRESSURE: 80 MMHG | SYSTOLIC BLOOD PRESSURE: 126 MMHG | OXYGEN SATURATION: 98 % | HEART RATE: 72 BPM

## 2025-05-22 DIAGNOSIS — Z23 NEED FOR VACCINATION: ICD-10-CM

## 2025-05-22 DIAGNOSIS — I10 BENIGN HYPERTENSION: ICD-10-CM

## 2025-05-22 DIAGNOSIS — R73.03 PREDIABETES: ICD-10-CM

## 2025-05-22 DIAGNOSIS — Z00.00 WELCOME TO MEDICARE PREVENTIVE VISIT: Primary | ICD-10-CM

## 2025-05-22 DIAGNOSIS — Z00.00 HEALTHCARE MAINTENANCE: ICD-10-CM

## 2025-05-22 DIAGNOSIS — G35 MULTIPLE SCLEROSIS (MULTI): ICD-10-CM

## 2025-05-22 DIAGNOSIS — Z12.4 ENCOUNTER FOR PAPANICOLAOU SMEAR FOR CERVICAL CANCER SCREENING: ICD-10-CM

## 2025-05-22 DIAGNOSIS — G95.19 OTHER VASCULAR MYELOPATHIES: ICD-10-CM

## 2025-05-22 DIAGNOSIS — E78.2 MIXED HYPERLIPIDEMIA: ICD-10-CM

## 2025-05-22 DIAGNOSIS — R73.9 ELEVATED BLOOD SUGAR: ICD-10-CM

## 2025-05-22 DIAGNOSIS — E66.813 CLASS 3 SEVERE OBESITY DUE TO EXCESS CALORIES WITH SERIOUS COMORBIDITY AND BODY MASS INDEX (BMI) OF 40.0 TO 44.9 IN ADULT: ICD-10-CM

## 2025-05-22 LAB — POC HEMOGLOBIN A1C: 5.8 % (ref 4.2–6.5)

## 2025-05-22 PROCEDURE — 87626 HPV SEP HI-RSK TYP&POOL RSLT: CPT

## 2025-05-22 RX ORDER — LOSARTAN POTASSIUM 100 MG/1
100 TABLET ORAL DAILY
Qty: 90 TABLET | Refills: 1 | Status: SHIPPED | OUTPATIENT
Start: 2025-05-22

## 2025-05-22 RX ORDER — AMLODIPINE BESYLATE 2.5 MG/1
2.5 TABLET ORAL DAILY
Qty: 90 TABLET | Refills: 0 | Status: SHIPPED | OUTPATIENT
Start: 2025-05-22

## 2025-05-22 RX ORDER — HYDROCHLOROTHIAZIDE 25 MG/1
25 TABLET ORAL DAILY
Qty: 90 TABLET | Refills: 1 | Status: SHIPPED | OUTPATIENT
Start: 2025-05-22

## 2025-05-22 ASSESSMENT — ACTIVITIES OF DAILY LIVING (ADL)
BATHING: INDEPENDENT
MANAGING_FINANCES: INDEPENDENT
DRESSING: INDEPENDENT
TAKING_MEDICATION: INDEPENDENT
GROCERY_SHOPPING: INDEPENDENT
DOING_HOUSEWORK: INDEPENDENT

## 2025-05-22 ASSESSMENT — PATIENT HEALTH QUESTIONNAIRE - PHQ9
2. FEELING DOWN, DEPRESSED OR HOPELESS: NOT AT ALL
SUM OF ALL RESPONSES TO PHQ9 QUESTIONS 1 AND 2: 0
1. LITTLE INTEREST OR PLEASURE IN DOING THINGS: NOT AT ALL

## 2025-05-22 NOTE — PROGRESS NOTES
"Subjective   Reason for Visit: Roseann Rubin is an 60 y.o. female here for a Medicare Wellness visit.     Past Medical, Surgical, and Family History reviewed and updated in chart.     HPI Medicare Wellness and follow uop  Dental every exam every 6 month.  Brushes 1-2 times a day  Flosses as well.   No regular skin checks.   Mammogram 6/6/2024 normal  Last pap over 5 years ago.   Colonsocpu 1/2025 1 polyp 5 year [id number]    Patient Care Team:  CHARLINE Cardenas as PCP - General  CHARLINE Cardenas as PCP - O Medicare Advantage PCP     Review of Systems    Objective   Vitals:  /80 (BP Location: Left arm, Patient Position: Sitting) Comment: 110/70 home reading  Pulse 72   Temp 36.4 °C (97.5 °F) (Temporal)   Ht 1.626 m (5' 4\")   Wt 117 kg (257 lb)   SpO2 98%   BMI 44.11 kg/m²       Physical Exam    Assessment & Plan  Welcome to Medicare preventive visit         Benign hypertension         Elevated blood sugar         Mixed hyperlipidemia         Multiple sclerosis (Multi)         Other vascular myelopathies         Class 3 severe obesity due to excess calories with serious comorbidity and body mass index (BMI) of 40.0 to 44.9 in adult              {AWV Counseling (Optional):92256}     " Effort: Pulmonary effort is normal.      Breath sounds: Normal breath sounds.   Chest:   Breasts:     Ji Score is 5.      Right: Normal.      Left: Normal.   Abdominal:      General: Abdomen is flat. Bowel sounds are normal.      Palpations: Abdomen is soft.      Hernia: There is no hernia in the left inguinal area or right inguinal area.   Genitourinary:     Exam position: Lithotomy position.      Pubic Area: No rash or pubic lice.       Ji stage (genital): 5.      Labia:         Right: No rash, tenderness, lesion or injury.         Left: No rash, tenderness, lesion or injury.       Urethra: No prolapse, urethral pain, urethral swelling or urethral lesion.      Vagina: Normal.      Cervix: Normal.      Uterus: Normal.       Adnexa: Right adnexa normal and left adnexa normal.      Rectum: Normal.   Musculoskeletal:         General: Normal range of motion.      Cervical back: Normal range of motion.   Lymphadenopathy:      Cervical: No cervical adenopathy.      Upper Body:      Right upper body: No supraclavicular, axillary or pectoral adenopathy.      Left upper body: No supraclavicular, axillary or pectoral adenopathy.      Lower Body: No right inguinal adenopathy. No left inguinal adenopathy.   Skin:     General: Skin is warm.   Neurological:      General: No focal deficit present.      Mental Status: She is alert and oriented to person, place, and time.   Psychiatric:         Mood and Affect: Mood normal.         Behavior: Behavior normal.         Assessment & Plan  Welcome to Medicare preventive visit         Benign hypertension         Elevated blood sugar         Mixed hyperlipidemia         Multiple sclerosis (Multi)         Other vascular myelopathies         Class 3 severe obesity due to excess calories with serious comorbidity and body mass index (BMI) of 40.0 to 44.9 in adult         Prediabetes    Orders:    Referral to Clinical Pharmacy; Future    Need for vaccination    Orders:    Pneumococcal  conjugate vaccine, 20-valent (PREVNAR 20)    Encounter for Papanicolaou smear for cervical cancer screening    Orders:    THINPREP PAP TEST    HPV High Risk PCR    Healthcare maintenance

## 2025-05-23 ASSESSMENT — ENCOUNTER SYMPTOMS
NEUROLOGICAL NEGATIVE: 1
EYES NEGATIVE: 1
ENDOCRINE NEGATIVE: 1
GASTROINTESTINAL NEGATIVE: 1
CARDIOVASCULAR NEGATIVE: 1
CONSTITUTIONAL NEGATIVE: 1
MUSCULOSKELETAL NEGATIVE: 1
PSYCHIATRIC NEGATIVE: 1
RESPIRATORY NEGATIVE: 1
HEMATOLOGIC/LYMPHATIC NEGATIVE: 1

## 2025-06-04 ENCOUNTER — APPOINTMENT (OUTPATIENT)
Dept: PHARMACY | Facility: HOSPITAL | Age: 60
End: 2025-06-04
Payer: MEDICARE

## 2025-06-04 DIAGNOSIS — R73.03 PREDIABETES: ICD-10-CM

## 2025-06-04 DIAGNOSIS — E66.813 CLASS 3 SEVERE OBESITY DUE TO EXCESS CALORIES WITH SERIOUS COMORBIDITY AND BODY MASS INDEX (BMI) OF 40.0 TO 44.9 IN ADULT: ICD-10-CM

## 2025-06-04 NOTE — Clinical Note
José Miguel Hall, Wondering if you thought a sleep study and/or referral to sleep medicine would be appropriate for Anjana? She snores and reports daytime sleepiness (although could be due to MS). STOP BANG screening puts her as intermediate risk.

## 2025-06-04 NOTE — PROGRESS NOTES
"  Clinical Pharmacy Appointment    Patient ID: Roseann Rubin \"Mart" is a 60 y.o. female who presents for Weight Management and Prediabetes.    Pt is here for First appointment.     Referring Provider: Isabel Chambers APRN-C*  PCP: CHARLINE Cardenas   Last visit with PCP: 5/22/2025   Next visit with PCP: 11/20/2025      Subjective     HPI  WEIGHT MANAGEMENT  PMH significant for hypertension, hyperlipidemia, prediabetes, and fatty liver  Special needs/barriers to therapy: cost/coverage of GLP1s    BMI Readings from Last 1 Encounters:   05/22/25 44.11 kg/m²     Lab Results   Component Value Date    HGBA1C 5.8 05/22/2025    GLUCOSE 124 (H) 11/21/2024     Lifestyle  Diet: 2 meals/day  Morning: coffee with cream  Brunch: eggs with cheese, toast, marks  Dinner: protein, vegetable, carb  Snacks: cookies, cheese, popcorn, mini ice cream  Drinks: water  Has worked with nutritionist/dietician? No  Physical Activity: limited due to fatigue, has treadmill in basement but not using at this time  Alcohol Use: glass of wine or cocktails, approximately 6-8 per week    Non-Pharmacological Therapy  Weight loss techniques attempted:  Self-directed dieting: reduced calorie, low carb, low fat    Pharmacological Therapy  Current Medications: None for weight loss/prediabetes  Previous Medications: None for weight loss/prediabetes     Pertinent PMH Review:  PMH of Pancreatitis: No  PMH of Gallbladder Disease: No  PMH of Retinopathy: No  PMH of MTC: No    Preventative Care  ASCVD Risk:   The 10-year ASCVD risk score (Duncan BUCIO, et al., 2019) is: 4.1%    Values used to calculate the score:      Age: 60 years      Sex: Female      Is Non- : No      Diabetic: No      Tobacco smoker: No      Systolic Blood Pressure: 126 mmHg      Is BP treated: Yes      HDL Cholesterol: 46.3 mg/dL      Total Cholesterol: 207 mg/dL  On Statin: Yes      Medication System Management  Patient's preferred pharmacy: [facility] in [place], " [place]  Adherence/Organization: no concerns identified  Affordability/Accessibility: cost/coverage issues with GLP1s  Insurance coverage of weight-loss medications? Not at this time  At this time, Medicare will only cover GLP1s for type 2 diabetes, established ASCVD, or moderate/severe obstructive sleep apnea   Eligible for copay cards/programs? No, government plan      Objective   Allergies[1]  Social History     Social History Narrative    Not on file      Medication Review  Current Outpatient Medications   Medication Instructions    amLODIPine (NORVASC) 2.5 mg, oral, Daily    cholecalciferol (Vitamin D-3) 50 MCG (2000 UT) tablet 1 tablet, Daily    cyanocobalamin (Vitamin B-12) 1,000 mcg/mL injection INJECT 1 ML  twice a week    hydroCHLOROthiazide (HYDRODIURIL) 25 mg, oral, Daily    levothyroxine (SYNTHROID, LEVOXYL) 75 mcg, oral, Daily, Take on an empty stomach at the same time each day, either 30 to 60 minutes prior to breakfast    losartan (COZAAR) 100 mg, oral, Daily    POMEGRANATE FRUIT EXTRACT ORAL Take by mouth.    rosuvastatin (CRESTOR) 20 mg, oral, Nightly      Vitals  BP Readings from Last 2 Encounters:   05/22/25 126/80   11/21/24 136/82     BMI Readings from Last 1 Encounters:   05/22/25 44.11 kg/m²      Labs  A1C  Lab Results   Component Value Date    HGBA1C 5.8 05/22/2025    HGBA1C 5.9 (H) 11/21/2024    HGBA1C 6.0 05/09/2024     BMP  Lab Results   Component Value Date    CALCIUM 10.2 11/21/2024     11/21/2024    K 3.8 11/21/2024    CO2 27 11/21/2024     11/21/2024    BUN 13 11/21/2024    CREATININE 0.72 11/21/2024    EGFR >90 11/21/2024     LFTs  Lab Results   Component Value Date    ALT 88 (H) 11/21/2024    AST 46 (H) 11/21/2024    ALKPHOS 81 11/21/2024    BILITOT 0.6 11/21/2024     FLP  Lab Results   Component Value Date    TRIG 255 (H) 11/21/2024    CHOL 207 (H) 11/21/2024    LDLF 95 11/03/2022    LDLCALC 110 (H) 11/21/2024    HDL 46.3 11/21/2024     Urine Microalbumin  No results found  "for: \"MICROALBCREA\"  Weight Management  Wt Readings from Last 3 Encounters:   05/22/25 117 kg (257 lb)   11/21/24 117 kg (258 lb)   05/09/24 117 kg (257 lb)      There is no height or weight on file to calculate BMI.     Assessment/Plan   Problem List Items Addressed This Visit       Class 3 severe obesity due to excess calories with serious comorbidity and body mass index (BMI) of 40.0 to 44.9 in adult     Other Visit Diagnoses         Prediabetes                DISCUSSION/PLAN:  Patient with hypertension, hyperlipidemia, fatty liver, and prediabetes who would benefit from starting GLP1 agonist for blood glucose control, weight loss, and cardiac/renal protection. Unfortunately, no coverage via insurance with current plan. We discussed cash pay options. Patient has risk factors for obstructive sleep apnea (STOP BANG score 3, intermediate). If sleep study is appropriate, will reassess after evaluation. Follow up as needed.    No medication changes at this time    Future Considerations:  Sleep study due to GERARDO risk factors  metformin initiation  Nutrition referral/increased exercise    Monitoring and Education:  Counseled on GLP1/GIP mechanism of action, benefits, and potential complications  Answered all patient questions    Continue all meds under the continuation of care with the referring provider and clinical pharmacy team.    Clinical Pharmacist follow-up: as needed  Orders placed: none at this time    Thank you,   Leyda Gómez, PharmD  Clinical Pharmacy Specialist, Primary Care   [phone]    Verbal consent to manage patient's drug therapy was obtained from the patient . Patient was informed she may decline to participate or withdraw from participation in pharmacy services at any time.         [1]   Allergies  Allergen Reactions    Bupropion Hcl Hives    Zyban [Bupropion Hcl (Smoking Deter)] Hives     "

## 2025-06-05 DIAGNOSIS — R06.83 SNORING: Primary | ICD-10-CM

## 2025-06-05 LAB
CYTOLOGY CMNT CVX/VAG CYTO-IMP: NORMAL
HPV HR GENOTYPES PNL CVX NAA+PROBE: NEGATIVE
LAB AP HPV GENOTYPE QUESTION: NO
LAB AP HPV HR: NORMAL
LABORATORY COMMENT REPORT: NORMAL
MENSTRUAL HX REPORTED: NORMAL
PATH REPORT.TOTAL CANCER: NORMAL

## 2025-11-20 ENCOUNTER — APPOINTMENT (OUTPATIENT)
Dept: PRIMARY CARE | Facility: CLINIC | Age: 60
End: 2025-11-20
Payer: MEDICARE